# Patient Record
Sex: FEMALE | Race: WHITE | Employment: FULL TIME
[De-identification: names, ages, dates, MRNs, and addresses within clinical notes are randomized per-mention and may not be internally consistent; named-entity substitution may affect disease eponyms.]

---

## 2017-04-24 DIAGNOSIS — G25.81 RLS (RESTLESS LEGS SYNDROME): ICD-10-CM

## 2017-04-24 DIAGNOSIS — G40.209 PARTIAL SYMPTOMATIC EPILEPSY WITH COMPLEX PARTIAL SEIZURES, NOT INTRACTABLE, WITHOUT STATUS EPILEPTICUS (H): ICD-10-CM

## 2017-04-24 DIAGNOSIS — G25.81 RESTLESS LEGS SYNDROME (RLS): ICD-10-CM

## 2017-04-25 RX ORDER — PRAMIPEXOLE DIHYDROCHLORIDE 0.5 MG/1
1 TABLET ORAL AT BEDTIME
Qty: 60 TABLET | Refills: 3 | Status: SHIPPED | OUTPATIENT
Start: 2017-04-25 | End: 2017-11-19

## 2017-07-22 ENCOUNTER — HEALTH MAINTENANCE LETTER (OUTPATIENT)
Age: 42
End: 2017-07-22

## 2017-09-15 DIAGNOSIS — G25.81 RESTLESS LEGS SYNDROME (RLS): ICD-10-CM

## 2017-09-15 DIAGNOSIS — G40.209 PARTIAL SYMPTOMATIC EPILEPSY WITH COMPLEX PARTIAL SEIZURES, NOT INTRACTABLE, WITHOUT STATUS EPILEPTICUS (H): ICD-10-CM

## 2017-09-15 DIAGNOSIS — G25.81 RLS (RESTLESS LEGS SYNDROME): ICD-10-CM

## 2017-09-18 RX ORDER — LAMOTRIGINE 100 MG/1
TABLET ORAL
Qty: 630 TABLET | Refills: 3 | Status: SHIPPED | OUTPATIENT
Start: 2017-09-18 | End: 2018-01-02

## 2017-11-17 ENCOUNTER — TELEPHONE (OUTPATIENT)
Dept: NEUROLOGY | Facility: CLINIC | Age: 42
End: 2017-11-17

## 2017-11-19 DIAGNOSIS — G25.81 RESTLESS LEGS SYNDROME (RLS): ICD-10-CM

## 2017-11-19 DIAGNOSIS — G25.81 RLS (RESTLESS LEGS SYNDROME): ICD-10-CM

## 2017-11-19 DIAGNOSIS — G40.209 PARTIAL SYMPTOMATIC EPILEPSY WITH COMPLEX PARTIAL SEIZURES, NOT INTRACTABLE, WITHOUT STATUS EPILEPTICUS (H): ICD-10-CM

## 2017-11-20 NOTE — TELEPHONE ENCOUNTER
last appointment 10/19/16    Called and voice mail left for patient to schedule follow up appointment for refill request

## 2017-11-21 RX ORDER — PRAMIPEXOLE DIHYDROCHLORIDE 0.5 MG/1
TABLET ORAL
Qty: 60 TABLET | Refills: 3 | Status: SHIPPED | OUTPATIENT
Start: 2017-11-21 | End: 2018-01-02

## 2017-11-21 NOTE — TELEPHONE ENCOUNTER
Also received request for refills of LAMOTRIGINE and MIRAPEX; confirmed with Pharmacy that there are refills available for Lamotrigine ( 3 refills confirmed ) and sent order for mirapex.  Called patient left detailed voice mail and indicated Prescription were at pharmacy and left call back number and name.

## 2017-11-28 NOTE — TELEPHONE ENCOUNTER
DMV Form signed by MD, faxed to [place] [employer], copy mailed to patient, and copy sent to be scanned into EHR

## 2017-12-29 ASSESSMENT — ENCOUNTER SYMPTOMS
MUSCLE CRAMPS: 0
MYALGIAS: 0
EYE WATERING: 0
NECK PAIN: 0
HEADACHES: 0
DISTURBANCES IN COORDINATION: 0
MUSCLE WEAKNESS: 0
TREMORS: 0
BACK PAIN: 0
DIZZINESS: 0
PARALYSIS: 0
SPEECH CHANGE: 0
NAIL CHANGES: 0
EYE PAIN: 0
NUMBNESS: 0
EYE REDNESS: 0
EYE IRRITATION: 1
LOSS OF CONSCIOUSNESS: 0
POOR WOUND HEALING: 0
MEMORY LOSS: 1
SKIN CHANGES: 0
WEAKNESS: 0
JOINT SWELLING: 0
STIFFNESS: 0
ARTHRALGIAS: 1
TINGLING: 0
SEIZURES: 0
DOUBLE VISION: 0

## 2018-01-02 ENCOUNTER — OFFICE VISIT (OUTPATIENT)
Dept: NEUROLOGY | Facility: CLINIC | Age: 43
End: 2018-01-02
Payer: COMMERCIAL

## 2018-01-02 VITALS
BODY MASS INDEX: 35.36 KG/M2 | HEIGHT: 70 IN | WEIGHT: 247 LBS | DIASTOLIC BLOOD PRESSURE: 81 MMHG | HEART RATE: 106 BPM | SYSTOLIC BLOOD PRESSURE: 122 MMHG

## 2018-01-02 DIAGNOSIS — G25.81 RLS (RESTLESS LEGS SYNDROME): ICD-10-CM

## 2018-01-02 DIAGNOSIS — G25.81 RESTLESS LEGS SYNDROME (RLS): ICD-10-CM

## 2018-01-02 DIAGNOSIS — G40.209 PARTIAL SYMPTOMATIC EPILEPSY WITH COMPLEX PARTIAL SEIZURES, NOT INTRACTABLE, WITHOUT STATUS EPILEPTICUS (H): Primary | ICD-10-CM

## 2018-01-02 RX ORDER — PRAMIPEXOLE DIHYDROCHLORIDE 0.5 MG/1
TABLET ORAL
Qty: 60 TABLET | Refills: 11 | Status: SHIPPED | OUTPATIENT
Start: 2018-01-02 | End: 2019-03-19

## 2018-01-02 RX ORDER — LAMOTRIGINE 100 MG/1
TABLET ORAL
Qty: 630 TABLET | Refills: 11 | Status: SHIPPED | OUTPATIENT
Start: 2018-01-02 | End: 2019-03-05

## 2018-01-02 NOTE — LETTER
2018       RE: Ashley Aguilar  [address] [facility] [address]     Dear Colleague,    Thank you for referring your patient, Ashley Aguilar, to the [facility] NEUROLOGY at [facility]. Please see a copy of my visit note below.    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  This has been all documented in the past.  She has been followed for some time.        She comes once a year.  She still has occasional aura of d?j? vu feeling, but she has not wanted to increase her medication.  She is on lamotrigine in a dose of 300/400.  Her last concentration was done on her previous visit in  in October and this was 10.3.  She is not really experiencing any toxicity on the anticonvulsants.      She has restless legs syndrome and takes Mirapex 1 mg at bedtime.  There are no concerns about reproduction as her  has had a ligation of spermatic duct.      She has had a ferritin test done on  which was 62 and I believe that shortly since after that she has been on the pramipexole.        PHYSICAL EXAMINATION:     GENERAL:   She is a pleasant woman.     VITAL SIGNS:   Blood pressure 122/81.  Pulse is 106.  Weight is 247, similar to previous.     NEUROLOGIC:   Her eye grounds look normal.  Normal extraocular movements.  Reflexes are good and strength in the arms.      In summary, she is seizure free but still has occasional aura.  We will keep her on the same anticonvulsant dose she has been, but switch the 400 in the evening to the morning and the 300 in the evening so there will be no impairment with her daily activities.  We will see her in a year and check her concentration today.           ABEL BILLS MD             D: 2018 15:03   T: 2018 15:29   MT: AKLOIS      Name:     ASHLEY AGUILAR   MRN:      [id number]        Account:      [id number]   :      1975            Service Date: 01/02/2018      Document: [id number]

## 2018-01-02 NOTE — PROGRESS NOTES
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  This has been all documented in the past.  She has been followed for some time.        She comes once a year.  She still has occasional aura of d?j? vu feeling, but she has not wanted to increase her medication.  She is on lamotrigine in a dose of 300/400.  Her last concentration was done on her previous visit in  in October and this was 10.3.  She is not really experiencing any toxicity on the anticonvulsants.      She has restless legs syndrome and takes Mirapex 1 mg at bedtime.  There are no concerns about reproduction as her  has had a ligation of spermatic duct.      She has had a ferritin test done on  which was 62 and I believe that shortly since after that she has been on the pramipexole.        PHYSICAL EXAMINATION:     GENERAL:   She is a pleasant woman.     VITAL SIGNS:   Blood pressure 122/81.  Pulse is 106.  Weight is 247, similar to previous.     NEUROLOGIC:   Her eye grounds look normal.  Normal extraocular movements.  Reflexes are good and strength in the arms.      In summary, she is seizure free but still has occasional aura.  We will keep her on the same anticonvulsant dose she has been, but switch the 400 in the evening to the morning and the 300 in the evening so there will be no impairment with her daily activities.  We will see her in a year and check her concentration today.      MD ABEL Haynes MD             D: 2018 15:03   T: 2018 15:29   MT: MIKKI      Name:     GAVINO SAMPSON   MRN:      [id number]        Account:      [id number]   :      1975           Service Date: 2018      Document: [id number]

## 2018-01-02 NOTE — MR AVS SNAPSHOT
After Visit Summary   1/2/2018    Ashley Aguilar    MRN: [id number]           Patient Information     Date Of Birth          1975        Visit Information        Provider Department      1/2/2018 2:30 PM Dion Casey MD [facility] Neurology        Today's Diagnoses     Partial symptomatic epilepsy with complex partial seizures, not intractable, without status epilepticus (H)    -  1    RLS (restless legs syndrome)        Restless legs syndrome (RLS)           Follow-ups after your visit        Follow-up notes from your care team     Return in about 1 year (around 1/2/2019).      Who to contact     Please call your clinic at [phone] to:    Ask questions about your health    Make or cancel appointments    Discuss your medicines    Learn about your test results    Speak to your doctor   If you have compliments or concerns about an experience at your clinic, or if you wish to file a complaint, please contact [facility] Patient Relations at [phone] or email us at [email or web address]         Additional Information About Your Visit        MyChart Information     [employer]oint gives you secure access to your electronic health record. If you see a primary care provider, you can also send messages to your care team and make appointments. If you have questions, please call your primary care clinic.  If you do not have a primary care provider, please call [phone] and they will assist you.      [employer]oint is an electronic gateway that provides easy, online access to your medical records. With [employer], you can request a clinic appointment, read your test results, renew a prescription or communicate with your care team.     To access your existing account, please contact your [facility] Clinic or call [phone] for assistance.        Care EveryWhere ID     This is your Care EveryWhere ID. This could be used by other organizations to access  "your [place] medical records  [id number]        Your Vitals Were     Pulse Height BMI (Body Mass Index)             106 1.778 m (5' 10\") 35.44 kg/m2          Blood Pressure from Last 3 Encounters:   No data found for BP    Weight from Last 3 Encounters:   No data found for Wt              Today, you had the following     No orders found for display         Today's Medication Changes          These changes are accurate as of: 1/2/18 11:59 PM.  If you have any questions, ask your nurse or doctor.               These medicines have changed or have updated prescriptions.        Dose/Directions    lamoTRIgine 100 MG tablet   Commonly known as:  LaMICtal   This may have changed:  See the new instructions.   Used for:  Restless legs syndrome (RLS), Partial symptomatic epilepsy with complex partial seizures, not intractable, without status epilepticus (H), RLS (restless legs syndrome)   Changed by:  Dion Casey MD        TAKE 4 TABS (400MG) BY MOUTH IN THE MORNING AND 3 TABS (300MG) IN THE EVENING   Quantity:  630 tablet   Refills:  11       pramipexole 0.5 MG tablet   Commonly known as:  MIRAPEX   This may have changed:  See the new instructions.   Used for:  RLS (restless legs syndrome), Restless legs syndrome (RLS), Partial symptomatic epilepsy with complex partial seizures, not intractable, without status epilepticus (H)   Changed by:  Dion Casey MD        TAKE 2 TABLETS (1 MG) BY MOUTH AT BEDTIME   Quantity:  60 tablet   Refills:  11            Where to get your medicines      These medications were sent to [facility]/pharm[facility] #[id number] - [place], [place] - [address] [place] [address]     Phone:  [phone]     lamoTRIgine 100 MG tablet    pramipexole 0.5 MG tablet                Primary Care Provider    None Specified       No primary provider on file.        Equal Access to Services     HANNY CARD AH: Andreas Lee, ruben calles, alin lutz " keisha nguyenbowenmarie randleSkylaaakamron ah. So [facility] [phone].    ATENCIÓN: Si edmond arshad, tiene a denney disposición servicios gratuitos de asistencia lingüística. Jyothi al [phone].    We comply with applicable federal civil rights laws and [place] laws. We do not discriminate on the basis of race, color, national origin, age, disability, sex, sexual orientation, or gender identity.            Thank you!     Thank you for choosing [facility] NEUROLOGY  for your care. Our goal is always to provide you with excellent care. Hearing back from our patients is one way we can continue to improve our services. Please take a few minutes to complete the written survey that you may receive in the mail after your visit with us. Thank you!             Your Updated Medication List - Protect others around you: Learn how to safely use, store and throw away your medicines at www.disposemymeds.org.          This list is accurate as of: 1/2/18 11:59 PM.  Always use your most recent med list.                   Brand Name Dispense Instructions for use Diagnosis    clotrimazole 1 % cream    LOTRIMIN    30 g    Apply  topically 2 times daily.        lamoTRIgine 100 MG tablet    LaMICtal    630 tablet    TAKE 4 TABS (400MG) BY MOUTH IN THE MORNING AND 3 TABS (300MG) IN THE EVENING    Restless legs syndrome (RLS), Partial symptomatic epilepsy with complex partial seizures, not intractable, without status epilepticus (H), RLS (restless legs syndrome)       pramipexole 0.5 MG tablet    MIRAPEX    60 tablet    TAKE 2 TABLETS (1 MG) BY MOUTH AT BEDTIME    RLS (restless legs syndrome), Restless legs syndrome (RLS), Partial symptomatic epilepsy with complex partial seizures, not intractable, without status epilepticus (H)

## 2018-03-18 DIAGNOSIS — G25.81 RLS (RESTLESS LEGS SYNDROME): ICD-10-CM

## 2018-03-18 DIAGNOSIS — G40.209 PARTIAL SYMPTOMATIC EPILEPSY WITH COMPLEX PARTIAL SEIZURES, NOT INTRACTABLE, WITHOUT STATUS EPILEPTICUS (H): ICD-10-CM

## 2018-03-18 DIAGNOSIS — G25.81 RESTLESS LEGS SYNDROME (RLS): ICD-10-CM

## 2018-03-29 RX ORDER — PRAMIPEXOLE DIHYDROCHLORIDE 0.5 MG/1
TABLET ORAL
Qty: 60 TABLET | Refills: 3 | OUTPATIENT
Start: 2018-03-29

## 2019-03-05 DIAGNOSIS — G40.209 PARTIAL SYMPTOMATIC EPILEPSY WITH COMPLEX PARTIAL SEIZURES, NOT INTRACTABLE, WITHOUT STATUS EPILEPTICUS (H): ICD-10-CM

## 2019-03-05 DIAGNOSIS — G25.81 RLS (RESTLESS LEGS SYNDROME): ICD-10-CM

## 2019-03-05 DIAGNOSIS — G25.81 RESTLESS LEGS SYNDROME (RLS): ICD-10-CM

## 2019-03-14 RX ORDER — LAMOTRIGINE 100 MG/1
TABLET ORAL
Qty: 651 TABLET | Refills: 3 | Status: SHIPPED | OUTPATIENT
Start: 2019-03-14 | End: 2019-03-21

## 2019-03-14 NOTE — TELEPHONE ENCOUNTER
Refill requested for: Lamotrigine 100 mg     Last ordered: 1/2/18    Last Filled: 12/4/18    Last Clinic Visit: 1/2/18    Next Clinic Visit: 3/21/19    From last visit note:   We will keep her on the same anticonvulsant dose she has been, but switch the 400 in the evening to the morning and the 300 in the evening so there will be no impairment with her daily activities.  We will see her in a year and check her concentration today.     Action taken:   Refill sent

## 2019-03-16 DIAGNOSIS — G25.81 RLS (RESTLESS LEGS SYNDROME): ICD-10-CM

## 2019-03-16 DIAGNOSIS — G25.81 RESTLESS LEGS SYNDROME (RLS): ICD-10-CM

## 2019-03-16 DIAGNOSIS — G40.209 PARTIAL SYMPTOMATIC EPILEPSY WITH COMPLEX PARTIAL SEIZURES, NOT INTRACTABLE, WITHOUT STATUS EPILEPTICUS (H): ICD-10-CM

## 2019-03-19 DIAGNOSIS — G25.81 RLS (RESTLESS LEGS SYNDROME): ICD-10-CM

## 2019-03-19 DIAGNOSIS — G40.209 PARTIAL SYMPTOMATIC EPILEPSY WITH COMPLEX PARTIAL SEIZURES, NOT INTRACTABLE, WITHOUT STATUS EPILEPTICUS (H): ICD-10-CM

## 2019-03-19 DIAGNOSIS — G25.81 RESTLESS LEGS SYNDROME (RLS): ICD-10-CM

## 2019-03-19 RX ORDER — PRAMIPEXOLE DIHYDROCHLORIDE 0.5 MG/1
TABLET ORAL
Qty: 60 TABLET | Refills: 11 | Status: SHIPPED | OUTPATIENT
Start: 2019-03-19 | End: 2020-03-04

## 2019-03-21 ENCOUNTER — OFFICE VISIT (OUTPATIENT)
Dept: NEUROLOGY | Facility: CLINIC | Age: 44
End: 2019-03-21
Payer: COMMERCIAL

## 2019-03-21 ENCOUNTER — APPOINTMENT (OUTPATIENT)
Dept: LAB | Facility: CLINIC | Age: 44
End: 2019-03-21
Payer: COMMERCIAL

## 2019-03-21 VITALS
OXYGEN SATURATION: 98 % | TEMPERATURE: 98 F | HEART RATE: 78 BPM | HEIGHT: 70 IN | BODY MASS INDEX: 35.52 KG/M2 | SYSTOLIC BLOOD PRESSURE: 125 MMHG | WEIGHT: 248.1 LBS | DIASTOLIC BLOOD PRESSURE: 84 MMHG

## 2019-03-21 DIAGNOSIS — G40.209 PARTIAL SYMPTOMATIC EPILEPSY WITH COMPLEX PARTIAL SEIZURES, NOT INTRACTABLE, WITHOUT STATUS EPILEPTICUS (H): ICD-10-CM

## 2019-03-21 DIAGNOSIS — G25.81 RESTLESS LEGS SYNDROME (RLS): ICD-10-CM

## 2019-03-21 DIAGNOSIS — G25.81 RLS (RESTLESS LEGS SYNDROME): ICD-10-CM

## 2019-03-21 RX ORDER — LAMOTRIGINE 100 MG/1
TABLET ORAL
Qty: 651 TABLET | Refills: 11 | Status: SHIPPED | OUTPATIENT
Start: 2019-03-21 | End: 2020-04-22

## 2019-03-21 ASSESSMENT — MIFFLIN-ST. JEOR: SCORE: 1865.62

## 2019-03-21 ASSESSMENT — PAIN SCALES - GENERAL: PAINLEVEL: NO PAIN (0)

## 2019-03-21 NOTE — NURSING NOTE
Chief Complaint   Patient presents with     RECHECK     UMP RETURN SEIZURE       Alessandra Almaguer, LISETTEN

## 2019-03-21 NOTE — LETTER
Date:March 26, 2019      Patient was self referred, no letter generated. Do not send.        [facility] Health Information

## 2019-03-21 NOTE — LETTER
3/21/2019       RE: Ashley Aguilar  [address] [place] [id number]     Dear Colleague,    Thank you for referring your patient, Ashley Aguilar, to the [facility] NEUROLOGY at [facility]. Please see a copy of my visit note below.    Service Date: 03/21/2019      INTERVAL HISTORY:  This patient is a 43-year-old and I have followed her for some time for complex partial seizure, temporal lobe origin, with a question of a congenital or cortical malformation on the left parasagittal region.  Her EEG has been positive around left temporal area.  I see her once a year.  She has done well over the years.  She has been very compliant with medication and she still is on 400 in the morning of Lamictal and 300 in the evening.  She is also on Mirapex 2 tablets at bedtime and she has had levels of Lamictal, which last one was 2016 and that was 10.3.  She does not have any children, is not planning pregnancy as her  had a vasectomy.  She had ferritin studies and this was normal in 2013.  The Mirapex works well for her.      In the intervening year or so, she only has had 1 aura which she felt was stress at work and that went away without consequences.  Other than that, she says she is doing very well, is happy with the medication.  As far as the memory issue which has been brought him in the past and for which we ordered neuropsychs but she did not go through with it.  She said this has not gotten any worse.      PHYSICAL EXAMINATION:  Shows a very pleasant woman.   VITAL SIGNS:   Blood pressure 125/84.  Pulse 78.  Weight is 248 pounds.     NEUROLOGIC:   She has normal cranial nerves including fundus exam.  Strength is good.  Coordination is good.  Reflexes, motor, gait and everything else is fine.      In summary, she continues seizure free except for rare aura.  Her memory issues seem stable.  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  Her EEG has been active.  We will repeat the EEG and see how it looks.  She has not seen neuropsych as she feels that there would be nothing that can be done by the memory.  We will do the Lamictal level today, keep her on the same medications.        MD DION Haynes MD             D: 2019   T: 2019   MT: MIKKI      Name:     GAVINO SAMPSON   MRN:      [id number]        Account:      [id number]   :      1975           Service Date: 2019      Document: [id number]       Again, thank you for allowing me to participate in the care of your patient.      Sincerely,    Dion Casey MD

## 2019-03-21 NOTE — PROGRESS NOTES
Service Date: 03/21/2019      INTERVAL HISTORY:  This patient is a 43-year-old and I have followed her for some time for complex partial seizure, temporal lobe origin, with a question of a congenital or cortical malformation on the left parasagittal region.  Her EEG has been positive around left temporal area.  I see her once a year.  She has done well over the years.  She has been very compliant with medication and she still is on 400 in the morning of Lamictal and 300 in the evening.  She is also on Mirapex 2 tablets at bedtime and she has had levels of Lamictal, which last one was 2016 and that was 10.3.  She does not have any children, is not planning pregnancy as her  had a vasectomy.  She had ferritin studies and this was normal in 2013.  The Mirapex works well for her.      In the intervening year or so, she only has had 1 aura which she felt was stress at work and that went away without consequences.  Other than that, she says she is doing very well, is happy with the medication.  As far as the memory issue which has been brought him in the past and for which we ordered neuropsychs but she did not go through with it.  She said this has not gotten any worse.      PHYSICAL EXAMINATION:  Shows a very pleasant woman.   VITAL SIGNS:   Blood pressure 125/84.  Pulse 78.  Weight is 248 pounds.     NEUROLOGIC:   She has normal cranial nerves including fundus exam.  Strength is good.  Coordination is good.  Reflexes, motor, gait and everything else is fine.      In summary, she continues seizure free except for rare aura.  Her memory issues seem stable.  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  Her EEG has been active.  We will repeat the EEG and see how it looks.  She has not seen neuropsych as she feels that there would be nothing that can be done by the memory.  We will do the Lamictal level today, keep her on the same  medications.        MD ABEL Haynes MD             D: 2019   T: 2019   MT: MIKKI      Name:     GAVINO SAMPSON   MRN:      [id number]        Account:      [id number]   :      1975           Service Date: 2019      Document: [id number]

## 2019-03-23 LAB — LAMOTRIGINE SERPL-MCNC: 11.3 UG/ML (ref 2.5–15)

## 2019-03-26 RX ORDER — PRAMIPEXOLE DIHYDROCHLORIDE 0.5 MG/1
TABLET ORAL
Qty: 60 TABLET | Refills: 11 | Status: SHIPPED | OUTPATIENT
Start: 2019-03-26 | End: 2021-08-11

## 2019-05-03 ENCOUNTER — ALLIED HEALTH/NURSE VISIT (OUTPATIENT)
Dept: NEUROLOGY | Facility: CLINIC | Age: 44
End: 2019-05-03
Payer: COMMERCIAL

## 2019-05-03 DIAGNOSIS — G40.209 PARTIAL SYMPTOMATIC EPILEPSY WITH COMPLEX PARTIAL SEIZURES, NOT INTRACTABLE, WITHOUT STATUS EPILEPTICUS (H): Primary | ICD-10-CM

## 2019-05-06 NOTE — PROCEDURES
Procedure Date: 05/03/2019      EEG #:   .      Outpatient routine EEG on 05/03/2019.      SOURCE FILE DURATION:  30 minutes.      PATIENT INFORMATION:  The patient is a 43-year-old female with a history of focal seizures in temporal lobe in origin with a question of congenital or cortical malformation in the left parasagittal region.  EEG is being done to evaluate for seizures.      MEDICATIONS:  Mirapex and lamotrigine.     TECHNICAL SUMMARY: This EEG monitoring procedure was performed with 23 scalp electrodes in 10-20 system placements, and additional scalp, precordial and other surface electrodes used for electrical referencing and artifact detection.      BACKGROUND:  In the fully awake state, the patient has a synchronous and symmetric well-modulated 9-10 Hz posterior dominant rhythm that attenuated with eye opening.  I am not able to appreciate focal slowing in the left parasagittal region in this file.  During drowsiness, the patient had slowing and attenuation of the posterior alpha rhythm and appearance of bursts of intermittent generalized theta slowing.  Nearly 90% of the record was awake.      The patient did fall asleep.  Stage II sleep was recorded in which synchronous and symmetric K complexes and sleep spindles were recorded.  Of note, the patient had multiple foot movements throughout sleep.  For instance, from time 10:39 to 10:50, in a 10-minute period she had 22 foot movements and this was persistent.  The patient reports that she has restless leg syndrome.      ACTIVATION PROCEDURES:  Photic stimulation and hyperventilation was done with no significant abnormalities.        IMPRESSION:  Routine awake and sleep is normal.  No electrographic seizures or epileptiform discharges are seen.  The patient has restless leg movements and had a lot of foot movements in stage II sleep.  There are no electrographic abnormalities to suggest there was a seizure.  Clinical correlation is advised.          EDMAR KING MD             D: 2019   T: 2019   MT: milvia      Name:     GAVINO SAMPSON   MRN:      [id number]        Account:        [id number]   :      1975           Procedure Date: 2019      Document: [id number]

## 2019-11-03 ENCOUNTER — HEALTH MAINTENANCE LETTER (OUTPATIENT)
Age: 44
End: 2019-11-03

## 2020-03-01 DIAGNOSIS — G25.81 RLS (RESTLESS LEGS SYNDROME): ICD-10-CM

## 2020-03-01 DIAGNOSIS — G25.81 RESTLESS LEGS SYNDROME (RLS): ICD-10-CM

## 2020-03-01 DIAGNOSIS — G40.209 PARTIAL SYMPTOMATIC EPILEPSY WITH COMPLEX PARTIAL SEIZURES, NOT INTRACTABLE, WITHOUT STATUS EPILEPTICUS (H): ICD-10-CM

## 2020-03-04 RX ORDER — PRAMIPEXOLE DIHYDROCHLORIDE 0.5 MG/1
TABLET ORAL
Qty: 180 TABLET | Refills: 3 | Status: SHIPPED | OUTPATIENT
Start: 2020-03-04 | End: 2020-11-13

## 2020-04-21 DIAGNOSIS — G25.81 RLS (RESTLESS LEGS SYNDROME): ICD-10-CM

## 2020-04-21 DIAGNOSIS — G25.81 RESTLESS LEGS SYNDROME (RLS): ICD-10-CM

## 2020-04-21 DIAGNOSIS — G40.209 PARTIAL SYMPTOMATIC EPILEPSY WITH COMPLEX PARTIAL SEIZURES, NOT INTRACTABLE, WITHOUT STATUS EPILEPTICUS (H): ICD-10-CM

## 2020-04-22 RX ORDER — LAMOTRIGINE 100 MG/1
TABLET ORAL
Qty: 651 TABLET | Refills: 0 | Status: SHIPPED | OUTPATIENT
Start: 2020-04-22 | End: 2020-10-29

## 2020-04-22 NOTE — TELEPHONE ENCOUNTER
Refill requested for: Lamotrigine 100 mg    Last ordered: 3/21/19    Last Filled:     Last Clinic Visit: 3/21/19    Next Clinic Visit: none scheduled    From last visit note:  In summary, she continues seizure free except for rare aura.  Her memory issues seem stable.  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  Her EEG has been active.  We will repeat the EEG and see how it looks.  She has not seen neuropsych as she feels that there would be nothing that can be done by the memory.  We will do the Lamictal level today, keep her on the same medications.    Action taken: Refilled for 90 day supply and message sent to Scheduling to contact patient for return appointment.

## 2020-04-22 NOTE — TELEPHONE ENCOUNTER
Rx Authorization:    Requested Medication/ Dose: Lamotrigine 100mg    Date last refill ordered: 3/21/2019    Quantity ordered: 651    # refills: 11    Date of last clinic visit with ordering provider: 3/21/2019    Date of next clinic visit with ordering provider: none    All pertinent protocol data (lab date/result):     Include pertinent information from patients message:

## 2020-04-22 NOTE — TELEPHONE ENCOUNTER
LAMOTRIGINE 100 MG TABLET       TAKE 4 TABLETS BY MOUTH IN THE MORNING AND TAKE 3 TABLETS IN THE EVENING   Last Written Prescription Date:  3/21/2019  Last Fill Quantity: 651,   # refills: 11  Last Office Visit : 3/21/2019  Future Office visit:  None    Routing refill request to provider for review/approval because:  Overdue for appt, Dr Casey sees at [facility].

## 2020-04-30 ENCOUNTER — VIRTUAL VISIT (OUTPATIENT)
Dept: NEUROLOGY | Facility: CLINIC | Age: 45
End: 2020-04-30
Payer: COMMERCIAL

## 2020-04-30 DIAGNOSIS — G40.209 PARTIAL SYMPTOMATIC EPILEPSY WITH COMPLEX PARTIAL SEIZURES, NOT INTRACTABLE, WITHOUT STATUS EPILEPTICUS (H): Primary | ICD-10-CM

## 2020-04-30 ASSESSMENT — PAIN SCALES - GENERAL: PAINLEVEL: NO PAIN (0)

## 2020-04-30 NOTE — PROGRESS NOTES
"Ashley Aguilar is a 44 year old female who is being evaluated via a billable video visit.      The patient has been notified of following:     \"This video visit will be conducted via a call between you and your physician/provider. We have found that certain health care needs can be provided without the need for an in-person physical exam.  This service lets us provide the care you need with a video conversation.  If a prescription is necessary we can send it directly to your pharmacy.  If lab work is needed we can place an order for that and you can then stop by our lab to have the test done at a later time.    Video visits are billed at different rates depending on your insurance coverage.  Please reach out to your insurance provider with any questions.    If during the course of the call the physician/provider feels a video visit is not appropriate, you will not be charged for this service.\"    Patient has given verbal consent for Video visit? YES     How would you like to obtain your AVS? EDDY    Patient would like the video invitation sent by: EMAIL    Will anyone else be joining your video visit?         Video-Visit Details    Type of service:  Video Visit x 35 min    Distant Location (provider location):  [facility] NEUROLOGY     Platform used for Video Visit:  amie"

## 2020-05-04 NOTE — PROGRESS NOTES
Service Date: 04/30/2020      VIDEO VISIT 35 min     This patient is 44 years old and underwent an approximately 30-minute video visit.  She is accompanied by her .  She is most cooperative and friendly and also is her .  She is a patient I have followed for a number of years with very well controlled partial complex seizures of presumably temporal lobe origin.  Her last EEG was in 2019 and this was a routine 30-minute EEG and was normal.  The patient did not have epileptiform discharges.  She did have a lot of restless legs during the EEG.  She has been on pramipexole with poor results 1 mg per day.  We told her she could increase it to 1.25 mg per day and she may require a new script.  She has been on Lamictal for a number of years with very good results.  She is on 400 mg in the morning and 300 mg in the evening and her last concentration of this on her previous visit was in 03/2018 and was 11.3.  She has been tolerating this drug very well.  No rashes or problems and has been on it for many years.  She reports that since the last visit she has had no auras or seizures.  She had reported in the past a memory issue and we had ordered testing for neuropsych but she did not carry that out.  Upon discussing that with her  and her today, this is not an ongoing concern.  He says that she has very rare memory lapses and Ashley admits that.  She does not think it is anything functionally impairing.  She does not think it is significant.  She has been imaged and the last time her brain imaging in 2015 was interpreted by Dr. Law and had subtle thickening of cortical gray matter and blurring of the gray and white interface as well as subcortical white matter hyperintensity in the mid segment on the left cingulate gyrus anterior to the marginal circumflex.  This could represent a focal of cortical malformation and there was some loss of normal internal architecture in the body of the left  hippocampus described.      Gavino today was examined by video.  She is normal and pleasant as usual.  Normal affect.  A closeup of her face shows eye movements are well performed.  She has some slight difficulty converging which is minor.  Face is symmetrical.  There is no arm weakness grossly appreciated and her interactions are very good.      In summary, her seizure control seems very stable and she has not had any seizures now for a number of years and no auras.  There are no other medical problems.  She is to have a level of the lamotrigine repeated and medication was reissued.  She will be having this level drawn at the [facility] and it was ordered.  This was a video visit over a period of 35 minutes and that went very well.            ABEL BILLS MD             D: 2020   T: 2020   MT: chanel      Name:     GAVINO SAMPSON   MRN:      [id number]        Account:      [id number]   :      1975           Service Date: 2020      Document: [id number]

## 2020-08-21 ENCOUNTER — TELEPHONE (OUTPATIENT)
Dept: NEUROLOGY | Facility: CLINIC | Age: 45
End: 2020-08-21

## 2020-08-21 NOTE — TELEPHONE ENCOUNTER
[facility] Call Center    Phone Message    May a detailed message be left on voicemail: yes     Reason for Call: Other: Ashley sent a CheckInPage message regarding faxing a letter that she attached back to [employer] at [phone]. She is requesting a call back to confirm it was faxed.     Action Taken: Message routed to:  [facility] ([facility]):  NEUROLOGY    Travel Screening: Not Applicable

## 2020-08-24 NOTE — TELEPHONE ENCOUNTER
Pt calling back to check status on forms. She is requesting to get this expedited ASAP since her employer is waiting for these forms and she can't return to work until the forms are faxed over to [place] [employer] [phone].     Please advise.

## 2020-08-25 NOTE — TELEPHONE ENCOUNTER
Pt following up on a call from yesterday in regards to her forms that are needed for her employer. Pt can't return to work until these forms are completed. Forms should be faxed over to [place] [employer] [phone]..     Please advise.

## 2020-10-29 ENCOUNTER — TELEPHONE (OUTPATIENT)
Dept: NEUROLOGY | Facility: CLINIC | Age: 45
End: 2020-10-29

## 2020-10-29 DIAGNOSIS — G40.209 PARTIAL SYMPTOMATIC EPILEPSY WITH COMPLEX PARTIAL SEIZURES, NOT INTRACTABLE, WITHOUT STATUS EPILEPTICUS (H): ICD-10-CM

## 2020-10-29 DIAGNOSIS — G25.81 RESTLESS LEGS SYNDROME (RLS): ICD-10-CM

## 2020-10-29 DIAGNOSIS — G25.81 RLS (RESTLESS LEGS SYNDROME): ICD-10-CM

## 2020-10-29 RX ORDER — LAMOTRIGINE 100 MG/1
TABLET ORAL
Qty: 651 TABLET | Refills: 2 | Status: SHIPPED | OUTPATIENT
Start: 2020-10-29 | End: 2021-07-26

## 2020-10-29 NOTE — TELEPHONE ENCOUNTER
Refill requested for: Lamotrigine 100 mg    Last ordered: 4/22/20    Last Filled: 8/3/20 (3 mo. Supply)    Last Clinic Visit: 4/30/20    Next Clinic Visit: none scheduled    Labs:    From last visit note:  In summary, her seizure control seems very stable and she has not had any seizures now for a number of years and no auras.  There are no other medical problems.  She is to have a level of the lamotrigine repeated and medication was reissued.  She will be having this level drawn at the [facility] and it was ordered.  This was a video visit over a period of 35 minutes and that went very well.     Action taken:   Refilled for remainder of 1 yr.

## 2020-11-13 DIAGNOSIS — G40.209 PARTIAL SYMPTOMATIC EPILEPSY WITH COMPLEX PARTIAL SEIZURES, NOT INTRACTABLE, WITHOUT STATUS EPILEPTICUS (H): ICD-10-CM

## 2020-11-13 DIAGNOSIS — G25.81 RLS (RESTLESS LEGS SYNDROME): ICD-10-CM

## 2020-11-13 DIAGNOSIS — G25.81 RESTLESS LEGS SYNDROME (RLS): ICD-10-CM

## 2020-11-13 RX ORDER — PRAMIPEXOLE DIHYDROCHLORIDE 0.5 MG/1
TABLET ORAL
Qty: 90 TABLET | Refills: 11 | Status: SHIPPED | OUTPATIENT
Start: 2020-11-13 | End: 2021-10-05

## 2020-11-16 ENCOUNTER — HEALTH MAINTENANCE LETTER (OUTPATIENT)
Age: 45
End: 2020-11-16

## 2021-05-28 ENCOUNTER — CARE COORDINATION (OUTPATIENT)
Dept: NEUROLOGY | Facility: CLINIC | Age: 46
End: 2021-05-28

## 2021-05-28 DIAGNOSIS — G25.81 RLS (RESTLESS LEGS SYNDROME): Primary | ICD-10-CM

## 2021-06-02 ENCOUNTER — TELEPHONE (OUTPATIENT)
Dept: NEUROLOGY | Facility: CLINIC | Age: 46
End: 2021-06-02

## 2021-06-02 ENCOUNTER — DOCUMENTATION ONLY (OUTPATIENT)
Dept: NEUROLOGY | Facility: CLINIC | Age: 46
End: 2021-06-02

## 2021-06-02 ENCOUNTER — CARE COORDINATION (OUTPATIENT)
Dept: NEUROLOGY | Facility: CLINIC | Age: 46
End: 2021-06-02

## 2021-06-02 NOTE — PROGRESS NOTES
Faxed updated lab order to [facility] [phone]. Included all requested information from [facility].     Taniya RAJAN

## 2021-06-02 NOTE — TELEPHONE ENCOUNTER
I called patient and left a detailed message I let her know that [facility] denied her request to have her labs drawn there as she has not established care with them ([facility]). I let her know that she can have labs drawn at any [facility] site. Info left  [facility] scheduling [phone] option 1

## 2021-07-23 DIAGNOSIS — G25.81 RESTLESS LEGS SYNDROME (RLS): ICD-10-CM

## 2021-07-23 DIAGNOSIS — G25.81 RLS (RESTLESS LEGS SYNDROME): ICD-10-CM

## 2021-07-23 DIAGNOSIS — G40.209 PARTIAL SYMPTOMATIC EPILEPSY WITH COMPLEX PARTIAL SEIZURES, NOT INTRACTABLE, WITHOUT STATUS EPILEPTICUS (H): ICD-10-CM

## 2021-07-26 RX ORDER — LAMOTRIGINE 100 MG/1
TABLET ORAL
Qty: 210 TABLET | Refills: 0 | Status: SHIPPED | OUTPATIENT
Start: 2021-07-26 | End: 2021-08-26

## 2021-07-26 NOTE — TELEPHONE ENCOUNTER
Last Clinic Visit: 4/30/20, no upcoming appointments.  30 day refill provided per protocol, routed to clinic scheduling for follow up  Filling per [facility] medication refill protocols - seizure medications.  Not all labs required.

## 2021-08-03 DIAGNOSIS — G25.81 RLS (RESTLESS LEGS SYNDROME): ICD-10-CM

## 2021-08-03 DIAGNOSIS — G40.209 PARTIAL SYMPTOMATIC EPILEPSY WITH COMPLEX PARTIAL SEIZURES, NOT INTRACTABLE, WITHOUT STATUS EPILEPTICUS (H): ICD-10-CM

## 2021-08-03 DIAGNOSIS — G25.81 RESTLESS LEGS SYNDROME (RLS): ICD-10-CM

## 2021-08-06 DIAGNOSIS — G40.209 PARTIAL SYMPTOMATIC EPILEPSY WITH COMPLEX PARTIAL SEIZURES, NOT INTRACTABLE, WITHOUT STATUS EPILEPTICUS (H): ICD-10-CM

## 2021-08-06 DIAGNOSIS — G25.81 RLS (RESTLESS LEGS SYNDROME): ICD-10-CM

## 2021-08-06 DIAGNOSIS — G25.81 RESTLESS LEGS SYNDROME (RLS): ICD-10-CM

## 2021-08-06 RX ORDER — PRAMIPEXOLE DIHYDROCHLORIDE 0.5 MG/1
TABLET ORAL
Qty: 270 TABLET | Refills: 3 | OUTPATIENT
Start: 2021-08-06

## 2021-08-09 RX ORDER — PRAMIPEXOLE DIHYDROCHLORIDE 0.5 MG/1
TABLET ORAL
Qty: 270 TABLET | Refills: 3 | OUTPATIENT
Start: 2021-08-09

## 2021-08-11 ENCOUNTER — TELEPHONE (OUTPATIENT)
Dept: NEUROLOGY | Facility: CLINIC | Age: 46
End: 2021-08-11

## 2021-08-11 NOTE — TELEPHONE ENCOUNTER
Patient called in to pharmacy requesting new order for 90 day refills - 2 months remain on current script and patient is over due for return visit.    Message sent to schedulers to call and set up return visit.    Tommy Arnett RN

## 2021-08-23 DIAGNOSIS — G40.209 PARTIAL SYMPTOMATIC EPILEPSY WITH COMPLEX PARTIAL SEIZURES, NOT INTRACTABLE, WITHOUT STATUS EPILEPTICUS (H): ICD-10-CM

## 2021-08-23 DIAGNOSIS — G25.81 RLS (RESTLESS LEGS SYNDROME): ICD-10-CM

## 2021-08-23 DIAGNOSIS — G25.81 RESTLESS LEGS SYNDROME (RLS): ICD-10-CM

## 2021-08-26 RX ORDER — LAMOTRIGINE 100 MG/1
TABLET ORAL
Qty: 210 TABLET | Refills: 1 | Status: SHIPPED | OUTPATIENT
Start: 2021-08-26 | End: 2021-10-05

## 2021-08-26 NOTE — TELEPHONE ENCOUNTER
LAMOTRIGINE 100 MG TABLET      Last Written Prescription Date:  72621  Last Fill Quantity: 210,   # refills: 0  Last Office Visit : 4/30/20  Future Office visit:  10/5/21  Prescription refilled to get to appointment date  Filling per [facility] medication refill protocols - seizure medications.  Not all labs required.

## 2021-09-18 ENCOUNTER — HEALTH MAINTENANCE LETTER (OUTPATIENT)
Age: 46
End: 2021-09-18

## 2021-09-29 DIAGNOSIS — G25.81 RLS (RESTLESS LEGS SYNDROME): ICD-10-CM

## 2021-09-29 DIAGNOSIS — G40.209 PARTIAL SYMPTOMATIC EPILEPSY WITH COMPLEX PARTIAL SEIZURES, NOT INTRACTABLE, WITHOUT STATUS EPILEPTICUS (H): ICD-10-CM

## 2021-09-29 DIAGNOSIS — G25.81 RESTLESS LEGS SYNDROME (RLS): ICD-10-CM

## 2021-09-30 RX ORDER — LAMOTRIGINE 100 MG/1
TABLET ORAL
Qty: 210 TABLET | Refills: 1 | OUTPATIENT
Start: 2021-09-30

## 2021-09-30 NOTE — TELEPHONE ENCOUNTER
LAMOTRIGINE 100 MG TABLET   lamoTRIgine (LAMICTAL) 100 MG tablet 210 tablet 1 8/26/2021  No   Sig: TAKE 4 TABLETS BY MOUTH IN THE MORNING AND 3 TABLETS IN THE EVENING. Please keep appointment 10/5/21   Sent to pharmacy as: lamoTRIgine 100 MG Oral Tablet (LaMICtal)   Class: E-Prescribe   Order: [id number]   E-Prescribing Status: Receipt confirmed by pharmacy (8/26/2021 10:27 AM CDT)       Printout Tracking    External Result Report     Medication Administration Instructions    TAKE 4 TABLETS BY MOUTH IN THE MORNING AND 3 TABLETS IN THE EVENING. Please keep appointment 10/5/21     Pharmacy    [facility]Y #[id number] - [place], [place] - [address]

## 2021-10-05 ENCOUNTER — VIRTUAL VISIT (OUTPATIENT)
Dept: NEUROLOGY | Facility: CLINIC | Age: 46
End: 2021-10-05
Payer: COMMERCIAL

## 2021-10-05 DIAGNOSIS — G25.81 RLS (RESTLESS LEGS SYNDROME): ICD-10-CM

## 2021-10-05 DIAGNOSIS — G40.209 PARTIAL SYMPTOMATIC EPILEPSY WITH COMPLEX PARTIAL SEIZURES, NOT INTRACTABLE, WITHOUT STATUS EPILEPTICUS (H): ICD-10-CM

## 2021-10-05 DIAGNOSIS — G25.81 RESTLESS LEGS SYNDROME (RLS): ICD-10-CM

## 2021-10-05 PROCEDURE — 99214 OFFICE O/P EST MOD 30 MIN: CPT | Mod: GT | Performed by: PSYCHIATRY & NEUROLOGY

## 2021-10-05 RX ORDER — LAMOTRIGINE 100 MG/1
TABLET ORAL
Qty: 210 TABLET | Refills: 11 | Status: SHIPPED | OUTPATIENT
Start: 2021-10-05 | End: 2022-10-10

## 2021-10-05 RX ORDER — PRAMIPEXOLE DIHYDROCHLORIDE 0.5 MG/1
TABLET ORAL
Qty: 90 TABLET | Refills: 11 | Status: SHIPPED | OUTPATIENT
Start: 2021-10-05 | End: 2022-10-06

## 2021-10-05 NOTE — LETTER
Date:December 16, 2021      Patient was self referred, no letter generated. Do not send.        [facility] Health Information

## 2021-10-05 NOTE — PROGRESS NOTES
Ashley is a 45 year old who is being evaluated via a billable video visit.      How would you like to obtain your AVS? MyChart  If the video visit is dropped, the invitation should be resent by: Send to e-mail at:     Type of service:  Video Visit x 35 min        Platform used for Video Visit: Yasm[employer]

## 2021-10-05 NOTE — PROGRESS NOTES
Service Date: 10/05/2021    This letter confirms a video virtual visit with Ms. Ashley Aguilar and her , which lasted approximately 35 minutes.  Both were very helpful and attentive and the  provided some information of value.    Ms. Aguilar reports that she has had no seizures now since the last visit with us.  Her main concern with us today is her memory issues.  Her  reports that has been occurring and this consists of some simple things.  However, she has been able to keep her job and actually moved on to a different job now.  She denies any difficulty with orientation or seizures.  She has been on Lamictal 4 tablets in the morning of lamotrigine and 3 tablets in the evening.  She has had previous concentrations of the drug measured and this last level was recorded at previous location at 10.3 and this last one 11.3 in 03/2019.  She has not had it since that time and I have not seen her.  She has had MRI studies in the past and these were reviewed today and we will repeat because of the memory issues to look at the hippocampal area with special attention to that.    Her last EEG was normal and that was in 2019.  We do not feel it needs to be repeated now.  She also has a diagnosis of restless leg syndrome and is on 0.75 pramipexole with good results.  Her last serum iron and ferritin was in the normal range.  The concern with memory is significant and it has not affected her job.  She is able to drive without getting lost.  It seems mostly immediately issues that are affected.  [facility], but I could not find the report on it and they are going to be sending me a copy of the report, which they have discussed.   feels that there may be a mixture of long-term and short-term memory loss present.    Her family history is no family members with dementia and there has not been any history of any encephalitis, meningitis in the patient or  head trauma of significance.  She has a history of depression, but she is more concerned about her memory issues.    PHYSICAL EXAMINATION:  Her blood pressure is 125/94.  She is fairly attentive and answers all questions.  We did not do a MoCA testing today.    In summary, seizure free with a history of progressive memory disorder, both short-term and some long-term and previous neuropsych 10 years ago.  We will repeat her neuropsych testing.  We will also repeat an MRI with special attention to the areas of concern in the hippocampus with possible hippocampal volumetric studies.  Lamictal level will be obtained and her medication will be renewed.    Dion Casey MD        D: 10/05/2021   T: 10/05/2021   MT: chanel    Name:     GAVINO SAMPSON  MRN:      [id number]        Account:      [id number]   :      1975           Service Date: 10/05/2021       Document: [id number]

## 2021-10-05 NOTE — LETTER
10/5/2021       RE: Ashley Aguilar  [address] [address]     Dear Colleague,    Thank you for referring your patient, Ashley Aguilar, to the [facility] NEUROLOGY CLINIC [place] at [facility]. Please see a copy of my visit note below.    Ashley is a 45 year old who is being evaluated via a billable video visit.      How would you like to obtain your AVS? MyChart  If the video visit is dropped, the invitation should be resent by: Send to e-mail at:     Type of service:  Video Visit x 35 min        Platform used for Video Visit: [employer]      Service Date: 10/05/2021    This letter confirms a video virtual visit with Ms. Ashley Aguilar and her , which lasted approximately 35 minutes.  Both were very helpful and attentive and the  provided some information of value.    Ms. Aguilar reports that she has had no seizures now since the last visit with us.  Her main concern with us today is her memory issues.  Her  reports that has been occurring and this consists of some simple things.  However, she has been able to keep her job and actually moved on to a different job now.  She denies any difficulty with orientation or seizures.  She has been on Lamictal 4 tablets in the morning of lamotrigine and 3 tablets in the evening.  She has had previous concentrations of the drug measured and this last level was recorded at previous location at 10.3 and this last one 11.3 in 03/2019.  She has not had it since that time and I have not seen her.  She has had MRI studies in the past and these were reviewed today and we will repeat because of the memory issues to look at the hippocampal area with special attention to that.    Her last EEG was normal and that was in 2019.  We do not feel it needs to be repeated now.  She also has a diagnosis of restless leg syndrome and is on 0.75 pramipexole with good results.  Her last serum iron and  ferritin was in the normal range.  The concern with memory is significant and it has not affected her job.  She is able to drive without getting lost.  It seems mostly immediately issues that are affected.  She was tested 10 years ago at the [facility] by Dr. Nellie Pierce, but I could not find the report on it and they are going to be sending me a copy of the report, which they have discussed.   feels that there may be a mixture of long-term and short-term memory loss present.    Her family history is no family members with dementia and there has not been any history of any encephalitis, meningitis in the patient or head trauma of significance.  She has a history of depression, but she is more concerned about her memory issues.    PHYSICAL EXAMINATION:  Her blood pressure is 125/94.  She is fairly attentive and answers all questions.  We did not do a MoCA testing today.    In summary, seizure free with a history of progressive memory disorder, both short-term and some long-term and previous neuropsych 10 years ago.  We will repeat her neuropsych testing.  We will also repeat an MRI with special attention to the areas of concern in the hippocampus with possible hippocampal volumetric studies.  Lamictal level will be obtained and her medication will be renewed.    Dion Casey MD        D: 10/05/2021   T: 10/05/2021   MT: chanel    Name:     GAVINO SAMPSON  MRN:      [id number]        Account:      [id number]   :      1975           Service Date: 10/05/2021       Document: [id number]      Again, thank you for allowing me to participate in the care of your patient.      Sincerely,    Dion Casey MD

## 2021-11-13 ENCOUNTER — HEALTH MAINTENANCE LETTER (OUTPATIENT)
Age: 46
End: 2021-11-13

## 2022-01-08 ENCOUNTER — HEALTH MAINTENANCE LETTER (OUTPATIENT)
Age: 47
End: 2022-01-08

## 2022-10-06 DIAGNOSIS — G25.81 RLS (RESTLESS LEGS SYNDROME): ICD-10-CM

## 2022-10-06 DIAGNOSIS — G40.209 PARTIAL SYMPTOMATIC EPILEPSY WITH COMPLEX PARTIAL SEIZURES, NOT INTRACTABLE, WITHOUT STATUS EPILEPTICUS (H): ICD-10-CM

## 2022-10-06 DIAGNOSIS — G25.81 RESTLESS LEGS SYNDROME (RLS): ICD-10-CM

## 2022-10-06 RX ORDER — PRAMIPEXOLE DIHYDROCHLORIDE 0.5 MG/1
TABLET ORAL
Qty: 90 TABLET | Refills: 11 | Status: SHIPPED | OUTPATIENT
Start: 2022-10-06

## 2022-10-10 DIAGNOSIS — G25.81 RLS (RESTLESS LEGS SYNDROME): ICD-10-CM

## 2022-10-10 DIAGNOSIS — G25.81 RESTLESS LEGS SYNDROME (RLS): ICD-10-CM

## 2022-10-10 DIAGNOSIS — G40.209 PARTIAL SYMPTOMATIC EPILEPSY WITH COMPLEX PARTIAL SEIZURES, NOT INTRACTABLE, WITHOUT STATUS EPILEPTICUS (H): ICD-10-CM

## 2022-10-10 NOTE — TELEPHONE ENCOUNTER
Former [facility] patient. Patient is out of medication. Scheduled for next available appointment w/ Dr. Contreras on 12/06/22

## 2022-10-12 RX ORDER — LAMOTRIGINE 100 MG/1
TABLET ORAL
Qty: 210 TABLET | Refills: 1 | Status: SHIPPED | OUTPATIENT
Start: 2022-10-12 | End: 2023-01-02

## 2022-12-24 ENCOUNTER — HEALTH MAINTENANCE LETTER (OUTPATIENT)
Age: 47
End: 2022-12-24

## 2023-01-02 ENCOUNTER — LAB (OUTPATIENT)
Dept: LAB | Facility: CLINIC | Age: 48
End: 2023-01-02
Payer: COMMERCIAL

## 2023-01-02 ENCOUNTER — OFFICE VISIT (OUTPATIENT)
Dept: NEUROLOGY | Facility: CLINIC | Age: 48
End: 2023-01-02
Payer: COMMERCIAL

## 2023-01-02 VITALS
DIASTOLIC BLOOD PRESSURE: 82 MMHG | OXYGEN SATURATION: 99 % | SYSTOLIC BLOOD PRESSURE: 142 MMHG | RESPIRATION RATE: 16 BRPM | HEART RATE: 71 BPM

## 2023-01-02 DIAGNOSIS — G25.81 RESTLESS LEGS SYNDROME (RLS): ICD-10-CM

## 2023-01-02 DIAGNOSIS — G25.81 RLS (RESTLESS LEGS SYNDROME): ICD-10-CM

## 2023-01-02 DIAGNOSIS — G40.209 COMPLEX PARTIAL SEIZURE WITH IMPAIRMENT OF CONSCIOUSNESS (H): ICD-10-CM

## 2023-01-02 DIAGNOSIS — G40.209 COMPLEX PARTIAL SEIZURE WITH IMPAIRMENT OF CONSCIOUSNESS (H): Primary | ICD-10-CM

## 2023-01-02 DIAGNOSIS — G40.209 PARTIAL SYMPTOMATIC EPILEPSY WITH COMPLEX PARTIAL SEIZURES, NOT INTRACTABLE, WITHOUT STATUS EPILEPTICUS (H): ICD-10-CM

## 2023-01-02 PROCEDURE — 80175 DRUG SCREEN QUAN LAMOTRIGINE: CPT | Mod: 90 | Performed by: PATHOLOGY

## 2023-01-02 PROCEDURE — 36415 COLL VENOUS BLD VENIPUNCTURE: CPT | Performed by: PATHOLOGY

## 2023-01-02 PROCEDURE — 99215 OFFICE O/P EST HI 40 MIN: CPT | Performed by: PSYCHIATRY & NEUROLOGY

## 2023-01-02 PROCEDURE — 99000 SPECIMEN HANDLING OFFICE-LAB: CPT | Performed by: PATHOLOGY

## 2023-01-02 PROCEDURE — 99417 PROLNG OP E/M EACH 15 MIN: CPT | Performed by: PSYCHIATRY & NEUROLOGY

## 2023-01-02 RX ORDER — LAMOTRIGINE 100 MG/1
TABLET ORAL
Qty: 630 TABLET | Refills: 3 | Status: SHIPPED | OUTPATIENT
Start: 2023-01-02 | End: 2024-01-31

## 2023-01-02 ASSESSMENT — PAIN SCALES - GENERAL: PAINLEVEL: NO PAIN (0)

## 2023-01-02 NOTE — LETTER
Date:January 3, 2023      Provider requested that no letter be sent. Do not send.       [facility]

## 2023-01-02 NOTE — LETTER
1/2/2023       RE: Ashley Aguilar  [address] [address]     Dear Colleague,    Thank you for referring your patient, Ashley Aguilar, to the [facility] NEUROLOGY CLINIC [place] at [facility]. Please see a copy of my visit note below.      [facility] Epilepsy Clinic:  NEW PATIENT EVALUATION         Service Date: 01/02/2023     HISTORY:  Ms. Ashley Aguilar is a 47-year-old, right-handed woman who was referred for consultation regarding focal epilepsy.  She was able to provide detailed medical history, which was supplemented by observations made by her  during focal seizures.  I reviewed extensive medical records on the [facility] chart.     Ictal semiology-history:   The patient reported a history of seizures consistent with isolated auras, complex partial seizures and a generalized tonic-clonic seizure.     Isolated auras began at 9 years of age and were rather stereotyped over the years.  The auras always began with a sudden onset of a vasile vu phenomenon, and usually involved either fear or at other times a sense of strange happiness.  On a few occasions she also had bilateral shoulder numbness.  These always lasted less than a minute.  She never had any postictal state with isolated auras.  Her  was with her during a number of the auras that she reported, and he never detected any sort of confusion, slowed responsiveness or other alteration in behavior.  She had very frequent auras in childhood and teens, but the auras persisted into adulthood.  She did not track actual frequency.  The most recent of these occurred in 2019.     Complex partial seizures also began at 9 years of age, but were much less frequent than isolated auras at any point in time.  The most recent of these occurred in 2013.  These typically would begin in waking, with onset of her aura, but the aura became prolonged and she would feel  confused or otherwise impaired, with lethargy afterwards.  Her  saw several of these seizures, and he noted that she always would stop what she was doing, stare motionlessly and unresponsively, without falling down.  On several occasions she had lip smacking late in the seizure, which would last a minute or two.  She always had a postictal state with lethargy and confusion lasting many minutes.  He never saw more than 1 complex partial seizure on the same day.     The patient probably had a single generalized tonic-clonic seizure in her lifetime, at 15 years of age, according to old medical records.  She confirmed that she had been told of this major seizure, but she herself had no recall of events at that period of time.    She denied any history of sudden involuntary jerks while fully awake, but she has had hypnic jerks.      The patient does not recognize exacerbating or remitting factors with regard to seizure frequency.      Epilepsy-seizure predispositions:   The patient has no family history of epilepsy or seizures.      She denied personal epilepsy risk factors, except to note that she was aware that she had an abnormality on brain MRI.      Specifically, she has no history of gestational or  injury, febrile convulsions, developmental delay, stroke, meningitis, encephalitis, or significant head injury.      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     A routine EEG at [facility] was reported as normal on 2019.  An earlier electroencephalogram was reported to show left frontal-central-temporal spikes in .      Epilepsy therapeutics:   Old medical records indicate that the patient was started first on carbamazepine in childhood, but she herself does not recall having received  this medication.  Apparently, this was transitioned to lamotrigine, which she has used without adverse effects.  Her most recent blood level on the chart was 11.3 mcg/mL on 03/21/2019.    PAST MEDICAL-SURGICAL HISTORY:    1.  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  2.  Restless legs syndrome.    3.  Status post right total hip arthroplasty (2022).    FAMILY HISTORY:  There is no family history of neurological conditions.     PERSONAL AND SOCIAL HISTORY:  The patient grew up in [place].  She graduated from high school and had 1-2 years of college courses.  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  She lives with her .  She has no children.  She does not use tobacco.  She has 2-3 drinks of alcohol about twice per week.     REVIEW OF SYSTEMS:  The patient reported that she has a history of restless legs syndrome, which has been quiescent while using ropinirole nightly.    She reported that right hip pain fully resolved after total hip arthroplasty in 10/2022.  She denied history of attention and memory disturbance, difficulties with comprehension and expression, difficulty in solving problems, weakness, tremors, numbness or tingling, incoordination, falling or difficulty in walking, urinary or fecal incontinence, headache and other pain, except as described above.  The patient denied any history of severe depression or suicidal ideation, severe anxiety, panic attacks, hallucinations, delusions, psychosis, substance abuse, or psychiatric hospitalization.  She denied rashes and easy bruising.  The remainder of a 14-system symptom review was negative except as noted above.       MEDICATIONS:  Lamotrigine 400/300 mg daily and pramipexole 1.5 mg at bedtime.    PHYSICAL EXAMINATION:    On examination the patient was an alert woman in no apparent distress.  Pulse 71, blood pressure 142/82 (on recheck 130/78), respirations 16 per  minute.  Skull was normocephalic and atraumatic.  Neck was supple, without signs of meningeal irritation.      On neurological examination, the patient appeared alert and was fully oriented to person, place, time, and reason for visit.  Speech showed normal articulation, fluency, repetitions, naming, syntax and comprehension.  She accurately repeated digits sequences, repeating 8 forward and 5 reversed.  At 10 minutes from presentation, 4 of 4 phrases were recalled.  No apraxias or atavistic signs were elicited.  Cranial nerves III through XII were normal.  Muscle masses, tones and strengths were normal throughout.  There was no pronator drift.  Sequential fine finger movements were performed normally with each hand.  No spontaneous tremors, myoclonus, or other abnormal movements were observed.  Sensations of light touch, pin prick, vibration and proprioception were reportedly normal throughout.  The rapid alternating movements, and finger-nose-finger and heel-shin maneuvers were performed normally bilaterally.  Romberg maneuver was negative.  Regular, heel, toe, tandem and reverse tandem walking were normal.  Deep tendon reflexes were normal and symmetric throughout.  Toes were downgoing bilaterally.      IMPRESSION:    The patient has a history highly consistent with focal epilepsy of limbic system origin with isolated auras, complex partial seizures and a single generalized tonic-clonic seizure.  In recent years she has done quite well on lamotrigine monotherapy.  I will refill the prescription and also check a lamotrigine level today.     There is a significant question of a malformation of cortical development versus a less likely possibility of an alien tissue lesion, or possibly 2 abnormalities in the left-sided limbic system (cingulate and hippocampal regions).  The patient elected to obtain a high-resolution brain MRI.  We also agreed to check an outpatient electroencephalogram.     The patient carries a  diagnosis of restless legs syndrome.  She is suppressing symptoms with ropinirole, prescribed by her primary physician.    I reviewed [place] regulations on seizures and driving with the patient.  She appeared to clearly understand that she would be prohibited from operating a motor vehicle within 3 months following any seizure or other episode with sudden unconsciousness or inability to sit up, and that she is required to report any future such seizure to the [facility] within 30 days after the event.      PLAN:    1.  Continue lamotrigine at the current dose.  2.  Check serum lamotrigine level today.  3.  Outpatient 3-hour video EEG.  4.  Brain 3 Carina Irma MRI.  5.  Return visit in approximately 3 months.    I spent 84 minutes in this patient care, with 62 minutes in direct patient contact, and 22 minutes in chart review and document preparation on the day of the visit.       Derian Contreras M.D.   Professor of Neurology        D: 2023   T: 2023   MT: maría    Name:     GAVINO SAMPSON  MRN:      [id number]        Account:      [id number]   :      1975           Service Date: 2023       Document: [id number]      Again, thank you for allowing me to participate in the care of your patient.      Sincerely,    Derian Contreras MD

## 2023-01-03 NOTE — PROGRESS NOTES
[facility]:  NEW PATIENT EVALUATION         Service Date: 01/02/2023     HISTORY:  Ms. Ashley Aguilar is a 47-year-old, right-handed woman who was referred for consultation regarding focal epilepsy.  She was able to provide detailed medical history, which was supplemented by observations made by her  during focal seizures.  I reviewed extensive medical records on the [facility] chart.     Ictal semiology-history:   The patient reported a history of seizures consistent with isolated auras, complex partial seizures and a generalized tonic-clonic seizure.     Isolated auras began at 9 years of age and were rather stereotyped over the years.  The auras always began with a sudden onset of a vasile vu phenomenon, and usually involved either fear or at other times a sense of strange happiness.  On a few occasions she also had bilateral shoulder numbness.  These always lasted less than a minute.  She never had any postictal state with isolated auras.  Her  was with her during a number of the auras that she reported, and he never detected any sort of confusion, slowed responsiveness or other alteration in behavior.  She had very frequent auras in childhood and teens, but the auras persisted into adulthood.  She did not track actual frequency.  The most recent of these occurred in 2019.     Complex partial seizures also began at 9 years of age, but were much less frequent than isolated auras at any point in time.  The most recent of these occurred in 2013.  These typically would begin in waking, with onset of her aura, but the aura became prolonged and she would feel confused or otherwise impaired, with lethargy afterwards.  Her  saw several of these seizures, and he noted that she always would stop what she was doing, stare motionlessly and unresponsively, without falling down.  On several occasions she had lip smacking late in the seizure, which would last a minute or two.   She always had a postictal state with lethargy and confusion lasting many minutes.  He never saw more than 1 complex partial seizure on the same day.     The patient probably had a single generalized tonic-clonic seizure in her lifetime, at 15 years of age, according to old medical records.  She confirmed that she had been told of this major seizure, but she herself had no recall of events at that period of time.    She denied any history of sudden involuntary jerks while fully awake, but she has had hypnic jerks.      The patient does not recognize exacerbating or remitting factors with regard to seizure frequency.      Epilepsy-seizure predispositions:   The patient has no family history of epilepsy or seizures.      She denied personal epilepsy risk factors, except to note that she was aware that she had an abnormality on brain MRI.      Specifically, she has no history of gestational or  injury, febrile convulsions, developmental delay, stroke, meningitis, encephalitis, or significant head injury.      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     A routine EEG at [facility] was reported as normal on 2019.  An earlier electroencephalogram was reported to show left frontal-central-temporal spikes in .      Epilepsy therapeutics:   Old medical records indicate that the patient was started first on carbamazepine in childhood, but she herself does not recall having received this medication.  Apparently, this was transitioned to lamotrigine, which she has used without adverse effects.  Her most recent blood level on the chart was 11.3 mcg/mL on 2019.    PAST MEDICAL-SURGICAL HISTORY:    1.  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  2.  Restless legs syndrome.     3.  Status post right total hip arthroplasty (2022).    FAMILY HISTORY:  There is no family history of neurological conditions.     PERSONAL AND SOCIAL HISTORY:  The patient grew up in [place].  She graduated from high school and had 1-2 years of college courses.  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  She lives with her .  She has no children.  She does not use tobacco.  She has 2-3 drinks of alcohol about twice per week.     REVIEW OF SYSTEMS:  The patient reported that she has a history of restless legs syndrome, which has been quiescent while using ropinirole nightly.    She reported that right hip pain fully resolved after total hip arthroplasty in 10/2022.  She denied history of attention and memory disturbance, difficulties with comprehension and expression, difficulty in solving problems, weakness, tremors, numbness or tingling, incoordination, falling or difficulty in walking, urinary or fecal incontinence, headache and other pain, except as described above.  The patient denied any history of severe depression or suicidal ideation, severe anxiety, panic attacks, hallucinations, delusions, psychosis, substance abuse, or psychiatric hospitalization.  She denied rashes and easy bruising.  The remainder of a 14-system symptom review was negative except as noted above.       MEDICATIONS:  Lamotrigine 400/300 mg daily and pramipexole 1.5 mg at bedtime.    PHYSICAL EXAMINATION:    On examination the patient was an alert woman in no apparent distress.  Pulse 71, blood pressure 142/82 (on recheck 130/78), respirations 16 per minute.  Skull was normocephalic and atraumatic.  Neck was supple, without signs of meningeal irritation.      On neurological examination, the patient appeared alert and was fully oriented to person, place, time, and reason for visit.  Speech showed normal articulation, fluency, repetitions, naming, syntax and  comprehension.  She accurately repeated digits sequences, repeating 8 forward and 5 reversed.  At 10 minutes from presentation, 4 of 4 phrases were recalled.  No apraxias or atavistic signs were elicited.  Cranial nerves III through XII were normal.  Muscle masses, tones and strengths were normal throughout.  There was no pronator drift.  Sequential fine finger movements were performed normally with each hand.  No spontaneous tremors, myoclonus, or other abnormal movements were observed.  Sensations of light touch, pin prick, vibration and proprioception were reportedly normal throughout.  The rapid alternating movements, and finger-nose-finger and heel-shin maneuvers were performed normally bilaterally.  Romberg maneuver was negative.  Regular, heel, toe, tandem and reverse tandem walking were normal.  Deep tendon reflexes were normal and symmetric throughout.  Toes were downgoing bilaterally.      IMPRESSION:    The patient has a history highly consistent with focal epilepsy of limbic system origin with isolated auras, complex partial seizures and a single generalized tonic-clonic seizure.  In recent years she has done quite well on lamotrigine monotherapy.  I will refill the prescription and also check a lamotrigine level today.     There is a significant question of a malformation of cortical development versus a less likely possibility of an alien tissue lesion, or possibly 2 abnormalities in the left-sided limbic system (cingulate and hippocampal regions).  The patient elected to obtain a high-resolution brain MRI.  We also agreed to check an outpatient electroencephalogram.     The patient carries a diagnosis of restless legs syndrome.  She is suppressing symptoms with ropinirole, prescribed by her primary physician.    I reviewed [place] regulations on seizures and driving with the patient.  She appeared to clearly understand that she would be prohibited from operating a motor vehicle within 3 months  following any seizure or other episode with sudden unconsciousness or inability to sit up, and that she is required to report any future such seizure to the DPS within 30 days after the event.      PLAN:    1.  Continue lamotrigine at the current dose.  2.  Check serum lamotrigine level today.  3.  Outpatient 3-hour video EEG.  4.  Brain 3 Carina Irma MRI.  5.  Return visit in approximately 3 months.    I spent 84 minutes in this patient care, with 62 minutes in direct patient contact, and 22 minutes in chart review and document preparation on the day of the visit.       Derian Contreras M.D.   Professor of Neurology        D: 2023   T: 2023   MT: [email or web address]    Name:     GAVINO SAMPSONNish  MRN:      [id number]        Account:      [id number]   :      1975           Service Date: 2023       Document: [id number]

## 2023-01-05 LAB — LAMOTRIGINE SERPL-MCNC: 9 UG/ML

## 2023-01-30 ENCOUNTER — ANCILLARY PROCEDURE (OUTPATIENT)
Dept: MRI IMAGING | Facility: CLINIC | Age: 48
End: 2023-01-30
Attending: PSYCHIATRY & NEUROLOGY
Payer: COMMERCIAL

## 2023-01-30 ENCOUNTER — ANCILLARY PROCEDURE (OUTPATIENT)
Dept: NEUROLOGY | Facility: CLINIC | Age: 48
End: 2023-01-30
Attending: PSYCHIATRY & NEUROLOGY
Payer: COMMERCIAL

## 2023-01-30 DIAGNOSIS — G40.209 COMPLEX PARTIAL SEIZURE WITH IMPAIRMENT OF CONSCIOUSNESS (H): ICD-10-CM

## 2023-01-30 RX ORDER — GADOBUTROL 604.72 MG/ML
0.1 INJECTION INTRAVENOUS ONCE
Status: COMPLETED | OUTPATIENT
Start: 2023-01-30 | End: 2023-01-30

## 2023-01-30 RX ADMIN — GADOBUTROL 11.2 ML: 604.72 INJECTION INTRAVENOUS at 09:52

## 2023-03-15 ENCOUNTER — TELEPHONE (OUTPATIENT)
Dept: NEUROLOGY | Facility: CLINIC | Age: 48
End: 2023-03-15

## 2023-03-15 ENCOUNTER — MYC MEDICAL ADVICE (OUTPATIENT)
Dept: NEUROLOGY | Facility: CLINIC | Age: 48
End: 2023-03-15
Payer: COMMERCIAL

## 2023-03-15 NOTE — TELEPHONE ENCOUNTER
[place] [facility] DVS Loss of Consciousness form received as a [employer] message  Form routed to [address], Encounter Created

## 2023-03-15 NOTE — TELEPHONE ENCOUNTER
[place] [facility] DVS Loss of Consciousness form received as a [employer] message  Form routed to [address], Encounter Created

## 2023-04-15 ENCOUNTER — HEALTH MAINTENANCE LETTER (OUTPATIENT)
Age: 48
End: 2023-04-15

## 2024-01-28 ENCOUNTER — HEALTH MAINTENANCE LETTER (OUTPATIENT)
Age: 49
End: 2024-01-28

## 2024-01-29 DIAGNOSIS — G25.81 RLS (RESTLESS LEGS SYNDROME): ICD-10-CM

## 2024-01-29 DIAGNOSIS — G25.81 RESTLESS LEGS SYNDROME (RLS): ICD-10-CM

## 2024-01-29 DIAGNOSIS — G40.209 PARTIAL SYMPTOMATIC EPILEPSY WITH COMPLEX PARTIAL SEIZURES, NOT INTRACTABLE, WITHOUT STATUS EPILEPTICUS (H): ICD-10-CM

## 2024-01-31 ENCOUNTER — MYC MEDICAL ADVICE (OUTPATIENT)
Dept: NEUROLOGY | Facility: CLINIC | Age: 49
End: 2024-01-31
Payer: COMMERCIAL

## 2024-01-31 DIAGNOSIS — G40.209 PARTIAL SYMPTOMATIC EPILEPSY WITH COMPLEX PARTIAL SEIZURES, NOT INTRACTABLE, WITHOUT STATUS EPILEPTICUS (H): ICD-10-CM

## 2024-01-31 DIAGNOSIS — G25.81 RLS (RESTLESS LEGS SYNDROME): ICD-10-CM

## 2024-01-31 DIAGNOSIS — G25.81 RESTLESS LEGS SYNDROME (RLS): ICD-10-CM

## 2024-01-31 RX ORDER — LAMOTRIGINE 100 MG/1
TABLET ORAL
Qty: 630 TABLET | Refills: 2 | OUTPATIENT
Start: 2024-01-31

## 2024-01-31 RX ORDER — LAMOTRIGINE 100 MG/1
TABLET ORAL
Qty: 210 TABLET | Refills: 3 | Status: SHIPPED | OUTPATIENT
Start: 2024-01-31

## 2024-02-05 ENCOUNTER — TELEPHONE (OUTPATIENT)
Dept: NEUROLOGY | Facility: CLINIC | Age: 49
End: 2024-02-05

## 2024-02-05 NOTE — TELEPHONE ENCOUNTER
Reached out to patient to schedule follow up visit with Dr. Contreras. No answer, left detailed message for a call back.

## 2024-02-05 NOTE — TELEPHONE ENCOUNTER
Patient returned call to clinic. Patient states that she will now be seeing a new neurologist in [place].

## 2024-04-29 DIAGNOSIS — G40.209 PARTIAL SYMPTOMATIC EPILEPSY WITH COMPLEX PARTIAL SEIZURES, NOT INTRACTABLE, WITHOUT STATUS EPILEPTICUS (H): ICD-10-CM

## 2024-04-29 DIAGNOSIS — G25.81 RLS (RESTLESS LEGS SYNDROME): ICD-10-CM

## 2024-04-29 DIAGNOSIS — G25.81 RESTLESS LEGS SYNDROME (RLS): ICD-10-CM

## 2024-05-08 RX ORDER — LAMOTRIGINE 100 MG/1
TABLET ORAL
Qty: 630 TABLET | Refills: 1 | OUTPATIENT
Start: 2024-05-08

## 2024-06-16 ENCOUNTER — HEALTH MAINTENANCE LETTER (OUTPATIENT)
Age: 49
End: 2024-06-16

## 2024-08-23 ENCOUNTER — TELEPHONE (OUTPATIENT)
Dept: NEUROLOGY | Facility: CLINIC | Age: 49
End: 2024-08-23

## 2024-08-23 NOTE — TELEPHONE ENCOUNTER
Received fax request.  Sent to EEG to make the CD and they will mail it to the requested address.  Judy Whitaker, EKATERINA

## 2024-08-23 NOTE — TELEPHONE ENCOUNTER
Incoming call from a nurse at [facility] in [facility] where the pt moved to. A Neurology provider Valentino Sanchez is requesting the patients EEG disc be mailed to them at [phone] [facility] [id number].

## 2025-06-21 ENCOUNTER — HEALTH MAINTENANCE LETTER (OUTPATIENT)
Age: 50
End: 2025-06-21